# Patient Record
Sex: FEMALE | Race: WHITE | NOT HISPANIC OR LATINO | Employment: UNEMPLOYED | URBAN - METROPOLITAN AREA
[De-identification: names, ages, dates, MRNs, and addresses within clinical notes are randomized per-mention and may not be internally consistent; named-entity substitution may affect disease eponyms.]

---

## 2019-09-24 ENCOUNTER — CONSULT (OUTPATIENT)
Dept: GASTROENTEROLOGY | Facility: CLINIC | Age: 54
End: 2019-09-24
Payer: COMMERCIAL

## 2019-09-24 ENCOUNTER — TELEPHONE (OUTPATIENT)
Dept: GASTROENTEROLOGY | Facility: CLINIC | Age: 54
End: 2019-09-24

## 2019-09-24 VITALS
WEIGHT: 133 LBS | SYSTOLIC BLOOD PRESSURE: 154 MMHG | DIASTOLIC BLOOD PRESSURE: 100 MMHG | HEIGHT: 65 IN | BODY MASS INDEX: 22.16 KG/M2 | HEART RATE: 74 BPM

## 2019-09-24 DIAGNOSIS — D50.0 IRON DEFICIENCY ANEMIA DUE TO CHRONIC BLOOD LOSS: ICD-10-CM

## 2019-09-24 DIAGNOSIS — I10 HYPERTENSION, UNSPECIFIED TYPE: ICD-10-CM

## 2019-09-24 DIAGNOSIS — R13.10 DYSPHAGIA, UNSPECIFIED TYPE: ICD-10-CM

## 2019-09-24 DIAGNOSIS — Z12.11 SCREENING FOR COLON CANCER: Primary | ICD-10-CM

## 2019-09-24 DIAGNOSIS — R10.13 EPIGASTRIC PAIN: Primary | ICD-10-CM

## 2019-09-24 DIAGNOSIS — Z83.71 FAMILY HISTORY OF COLONIC POLYPS: ICD-10-CM

## 2019-09-24 PROBLEM — Z83.719 FAMILY HISTORY OF COLONIC POLYPS: Status: ACTIVE | Noted: 2019-09-24

## 2019-09-24 PROCEDURE — 99204 OFFICE O/P NEW MOD 45 MIN: CPT | Performed by: INTERNAL MEDICINE

## 2019-09-24 RX ORDER — IVERMECTIN 10 MG/G
CREAM TOPICAL
COMMUNITY
Start: 2018-11-21

## 2019-09-24 RX ORDER — CETIRIZINE HYDROCHLORIDE 10 MG/1
1 TABLET ORAL AS NEEDED
COMMUNITY

## 2019-09-24 RX ORDER — FERROUS SULFATE 325(65) MG
TABLET ORAL
COMMUNITY
Start: 2018-11-21 | End: 2020-08-12

## 2019-09-24 RX ORDER — TRANEXAMIC ACID 650 1/1
2 TABLET ORAL 3 TIMES DAILY
COMMUNITY
Start: 2019-07-10

## 2019-09-24 RX ORDER — DOCUSATE SODIUM 100 MG/1
CAPSULE, LIQUID FILLED ORAL
COMMUNITY
End: 2020-08-12

## 2019-09-24 RX ORDER — MULTIVITAMIN
1 TABLET ORAL DAILY
COMMUNITY

## 2019-09-24 NOTE — PROGRESS NOTES
[facility] - Outpatient Consultation  Margarita Villagomez 47 y o  female MRN: [id number]  Encounter: [id number]    ASSESSMENT AND PLAN:      1  Epigastric pain  49-year-old female with 2 weeks of severe epigastric pain and dysphagia in setting of chronic aspirin use for headaches  Worrisome for peptic ulcer disease with peptic stricture  - Schedule EGD @ [facility]   - will start PPI therapy postprocedure as patient would like to see the results from EGD 1st  - stop all NSAIDs and Excedrin  - limit alcohol intake during this time    2  Dysphagia, unspecified type  EGD +/- dilation if evidence of stricture noted    3  Hypertension, unspecified type  Blood pressure is 150/100 today  I asked the patient to follow up with her primary care doctor regarding this  Patient thinks that the blood pressure comes when she has bad headaches which appears to be due to hormonal changes  4  Iron deficiency anemia due to chronic blood loss  History of severe metromenorrhagia status post D and C this past summer with improvement  Last iron studies noted from July 2019 showed normal hemoglobin and normal iron counts  I recommended that she stops iron supplementation at this point but she is nervous because when she is iron deficient, she gets a lot of restless leg and angular cheilitis  She will discuss with her GYN  5  Family history of colonic polyps  Brother with colon polyps  Patient has never had any colonoscopies so we will schedule today  Followup Appointment:  3 months  ______________________________________________________________________    Chief Complaint   Patient presents with    Dysphagia     feels like a lump in throat    Upset stomach       HPI:   Margarita Villagomez is a 47y o  year old female who presents today as a new patient at the request of her primary care provider for epigastric pain and dysphagia    Patient states that for 2 years now she has had severe menstrual bleeding  She has been seeing her GYN who put her on iron supplementation for iron deficiency anemia  She had a recent D and C to help alleviate this situation  However, with the hormone changes, she has been having a lot of headaches  And she has been taking a lot of Excedrin and aspirin  In the past 2 weeks, she has noticed a lot of epigastric discomfort, sharp twisting pains, nonradiating, occasional nausea associated with symptoms  She is also having some issues with trouble swallowing characterized by chest tightness with eating and sensation that she is choking  Denies any GI bleeding or diarrhea or bowel changes  Denies melena although her stools are dark due to her iron supplementation  The core of the issues is that she gets these headaches from her hormone changes and high blood pressure for which she will follow-up with her primary care doctor about      Historical Information   Past Medical History:   Diagnosis Date    Anemia     due to heave menses    Headache     Rosacea     Seasonal allergies      Past Surgical History:   Procedure Laterality Date    DILATION AND CURETTAGE OF UTERUS  09/2019    LAPAROSCOPY       Social History     Substance and Sexual Activity   Alcohol Use Yes    Frequency: 2-4 times a month     Social History     Substance and Sexual Activity   Drug Use Never     Social History     Tobacco Use   Smoking Status Never Smoker   Smokeless Tobacco Never Used     Family History   Problem Relation Age of Onset    Hypertension Mother     Hypertension Father     Hypertension Sister     Colon polyps Brother     Hypertension Sister     Colon cancer Neg Hx     Inflammatory bowel disease Neg Hx        Meds/Allergies     Current Outpatient Medications:     cetirizine (ZYRTEC ALLERGY) 10 mg tablet    docusate sodium (COLACE) 100 mg capsule    ferrous sulfate 325 (65 Fe) mg tablet    Ivermectin (SOOLANTRA) 1 % CREA    Magnesium 100 MG CAPS   Multiple Vitamin (MULTIVITAMIN) tablet    Tranexamic Acid 650 MG TABS    Allergies   Allergen Reactions    Codeine        PHYSICAL EXAM:    Blood pressure 154/100, pulse 74, height 5' 4 5" (1 638 m), weight 60 3 kg (133 lb)  Body mass index is 22 48 kg/m²  General Appearance: NAD, cooperative, alert  Eyes: Anicteric, PERRLA, EOMI  ENT:  Normocephalic, atraumatic, normal mucosa  Neck:  Supple, symmetrical, trachea midline,   Resp:  Clear to auscultation bilaterally; no rales, rhonchi or wheezing; respirations unlabored   CV:  S1 S2, Regular rate and rhythm; no murmur, rub, or gallop  GI:  Soft, non-tender, non-distended; normal bowel sounds; no masses, no organomegaly   Rectal: Deferred  Musculoskeletal: No cyanosis, clubbing or edema  Normal ROM  Skin:  No jaundice, rashes, or lesions   Heme/Lymph: No palpable cervical lymphadenopathy  Psych: Normal affect, good eye contact  Neuro: No gross deficits, AAOx3    Lab Results:   No results found for: WBC, HGB, HCT, MCV, PLT  No results found for: NA, K, CL, CO2, ANIONGAP, BUN, CREATININE, GLUCOSE, GLUF, CALCIUM, CORRECTEDCA, AST, ALT, ALKPHOS, PROT, BILITOT, EGFR  No results found for: IRON, TIBC, FERRITIN  No results found for: LIPASE    Radiology Results:   No results found  REVIEW OF SYSTEMS:    CONSTITUTIONAL: Denies any fever, chills, rigors, and weight loss  Positive fatigue  HEENT: No earache or tinnitus  Denies hearing loss or visual disturbances  CARDIOVASCULAR: No chest pain or palpitations  RESPIRATORY: Denies any cough, hemoptysis, shortness of breath or dyspnea on exertion  GASTROINTESTINAL: As noted in the History of Present Illness  GENITOURINARY: No problems with urination  Denies any hematuria or dysuria  NEUROLOGIC: No dizziness or vertigo, denies headaches  MUSCULOSKELETAL: Denies any muscle or joint pain  SKIN: Denies skin rashes or itching  ENDOCRINE: Denies excessive thirst  Denies intolerance to heat or cold    PSYCHOSOCIAL: Denies depression or anxiety  Denies any recent memory loss

## 2019-09-24 NOTE — LETTER
September 24, 2019     ANNITA Solano  [address] [address]    Patient: Joel Rodriguez   YOB: 1965   Date of Visit: 9/24/2019       Dear Dr Indira Rosas:    Thank you for referring Joel Rodriguez to me for evaluation  Below are my notes for this consultation  If you have questions, please do not hesitate to call me  I look forward to following your patient along with you  Sincerely,        Gabbie Harley MD        CC: No Recipients  Gabbie Harley MD  9/24/2019  9:45 AM  Sign at close encounter    [address] [facility] - Outpatient Consultation  Joel Rodriguez 47 y o  female MRN: [id number]  Encounter: [id number]    ASSESSMENT AND PLAN:      1  Epigastric pain  59-year-old female with 2 weeks of severe epigastric pain and dysphagia in setting of chronic aspirin use for headaches  Worrisome for peptic ulcer disease with peptic stricture  - Schedule EGD @ [address] [place] [address]   - will start PPI therapy postprocedure as patient would like to see the results from EGD 1st  - stop all NSAIDs and Excedrin  - limit alcohol intake during this time    2  Dysphagia, unspecified type  EGD +/- dilation if evidence of stricture noted    3  Hypertension, unspecified type  Blood pressure is 150/100 today  I asked the patient to follow up with her primary care doctor regarding this  Patient thinks that the blood pressure comes when she has bad headaches which appears to be due to hormonal changes  4  Iron deficiency anemia due to chronic blood loss  History of severe metromenorrhagia status post D and C this past summer with improvement  Last iron studies noted from July 2019 showed normal hemoglobin and normal iron counts  I recommended that she stops iron supplementation at this point but she is nervous because when she is iron deficient, she gets a lot of restless leg and angular cheilitis  She will discuss with her GYN      5  Family history of colonic polyps  Brother with colon polyps  Patient has never had any colonoscopies so we will schedule today  Followup Appointment:  3 months  ______________________________________________________________________    Chief Complaint   Patient presents with    Dysphagia     feels like a lump in throat    Upset stomach       HPI:   Alethea Kirby is a 47y o  year old female who presents today as a new patient at the request of her primary care provider for epigastric pain and dysphagia  Patient states that for 2 years now she has had severe menstrual bleeding  She has been seeing her GYN who put her on iron supplementation for iron deficiency anemia  She had a recent D and C to help alleviate this situation  However, with the hormone changes, she has been having a lot of headaches  And she has been taking a lot of Excedrin and aspirin  In the past 2 weeks, she has noticed a lot of epigastric discomfort, sharp twisting pains, nonradiating, occasional nausea associated with symptoms  She is also having some issues with trouble swallowing characterized by chest tightness with eating and sensation that she is choking  Denies any GI bleeding or diarrhea or bowel changes  Denies melena although her stools are dark due to her iron supplementation  The core of the issues is that she gets these headaches from her hormone changes and high blood pressure for which she will follow-up with her primary care doctor about      Historical Information   Past Medical History:   Diagnosis Date    Anemia     due to heave menses    Headache     Rosacea     Seasonal allergies      Past Surgical History:   Procedure Laterality Date    DILATION AND CURETTAGE OF UTERUS  09/2019    LAPAROSCOPY       Social History     Substance and Sexual Activity   Alcohol Use Yes    Frequency: 2-4 times a month     Social History     Substance and Sexual Activity   Drug Use Never     Social History     Tobacco Use Smoking Status Never Smoker   Smokeless Tobacco Never Used     Family History   Problem Relation Age of Onset    Hypertension Mother     Hypertension Father     Hypertension Sister     Colon polyps Brother     Hypertension Sister     Colon cancer Neg Hx     Inflammatory bowel disease Neg Hx        Meds/Allergies     Current Outpatient Medications:     cetirizine (ZYRTEC ALLERGY) 10 mg tablet    docusate sodium (COLACE) 100 mg capsule    ferrous sulfate 325 (65 Fe) mg tablet    Ivermectin (SOOLANTRA) 1 % CREA    Magnesium 100 MG CAPS    Multiple Vitamin (MULTIVITAMIN) tablet    Tranexamic Acid 650 MG TABS    Allergies   Allergen Reactions    Codeine        PHYSICAL EXAM:    Blood pressure 154/100, pulse 74, height 5' 4 5" (1 638 m), weight 60 3 kg (133 lb)  Body mass index is 22 48 kg/m²  General Appearance: NAD, cooperative, alert  Eyes: Anicteric, PERRLA, EOMI  ENT:  Normocephalic, atraumatic, normal mucosa  Neck:  Supple, symmetrical, trachea midline,   Resp:  Clear to auscultation bilaterally; no rales, rhonchi or wheezing; respirations unlabored   CV:  S1 S2, Regular rate and rhythm; no murmur, rub, or gallop  GI:  Soft, non-tender, non-distended; normal bowel sounds; no masses, no organomegaly   Rectal: Deferred  Musculoskeletal: No cyanosis, clubbing or edema  Normal ROM  Skin:  No jaundice, rashes, or lesions   Heme/Lymph: No palpable cervical lymphadenopathy  Psych: Normal affect, good eye contact  Neuro: No gross deficits, AAOx3    Lab Results:   No results found for: WBC, HGB, HCT, MCV, PLT  No results found for: NA, K, CL, CO2, ANIONGAP, BUN, CREATININE, GLUCOSE, GLUF, CALCIUM, CORRECTEDCA, AST, ALT, ALKPHOS, PROT, BILITOT, EGFR  No results found for: IRON, TIBC, FERRITIN  No results found for: LIPASE    Radiology Results:   No results found  REVIEW OF SYSTEMS:    CONSTITUTIONAL: Denies any fever, chills, rigors, and weight loss  Positive fatigue  HEENT: No earache or tinnitus  Denies hearing loss or visual disturbances  CARDIOVASCULAR: No chest pain or palpitations  RESPIRATORY: Denies any cough, hemoptysis, shortness of breath or dyspnea on exertion  GASTROINTESTINAL: As noted in the History of Present Illness  GENITOURINARY: No problems with urination  Denies any hematuria or dysuria  NEUROLOGIC: No dizziness or vertigo, denies headaches  MUSCULOSKELETAL: Denies any muscle or joint pain  SKIN: Denies skin rashes or itching  ENDOCRINE: Denies excessive thirst  Denies intolerance to heat or cold  PSYCHOSOCIAL: Denies depression or anxiety  Denies any recent memory loss

## 2019-09-24 NOTE — TELEPHONE ENCOUNTER
Combo per Dr Moreno Shall  Patient will cb to schedule unsure with her schedule  Please follow up   She did have her prep today

## 2019-09-25 NOTE — PROGRESS NOTES
I called [employer], requested they enter a fax number for contact: Char Phoenix fax #[phone]  I mannually faxed the note as requested

## 2019-10-04 NOTE — TELEPHONE ENCOUNTER
Pt left  mssg stating she has colon and upper endo 10/30 at 1240; has ques about medicines; takes blood pressure meds and has headaches/takes Tylenol; asks for [phone]

## 2019-10-31 DIAGNOSIS — R10.13 EPIGASTRIC PAIN: Primary | ICD-10-CM

## 2019-10-31 NOTE — TELEPHONE ENCOUNTER
Pt seen at [facility]  Dr Romero Cassette prescribed omeprazole 40 mg  One daily  Qty 30 with 2 refills

## 2019-11-01 RX ORDER — OMEPRAZOLE 40 MG/1
40 CAPSULE, DELAYED RELEASE ORAL DAILY
Qty: 30 CAPSULE | Refills: 2 | Status: SHIPPED | OUTPATIENT
Start: 2019-11-01 | End: 2020-01-24 | Stop reason: SDUPTHER

## 2019-11-06 ENCOUNTER — TELEPHONE (OUTPATIENT)
Dept: GASTROENTEROLOGY | Facility: CLINIC | Age: 54
End: 2019-11-06

## 2019-11-06 NOTE — TELEPHONE ENCOUNTER
Called patient regarding results from EGD and colonoscopy performed on October 30, 2019 for epigastric pain, dysphagia, family history of colon polyps  Biopsies negative for celiac, H pylori, dysplasia, eosinophilic esophagitis, Wilcox's  Colon polyp is hyperplastic  Area of inflammation is likely procedure related negative for colitis or dysplasia  Instructed patient to follow up with us in the office      RECALL:  Colon recall 5 years

## 2020-01-24 DIAGNOSIS — R10.13 EPIGASTRIC PAIN: ICD-10-CM

## 2020-01-24 RX ORDER — OMEPRAZOLE 40 MG/1
40 CAPSULE, DELAYED RELEASE ORAL DAILY
Qty: 30 CAPSULE | Refills: 6 | Status: SHIPPED | OUTPATIENT
Start: 2020-01-24 | End: 2020-02-20

## 2020-01-24 NOTE — TELEPHONE ENCOUNTER
Pt called to request refill of Omeprazole 40; notes she was supposed to sched f/u appt after proc but just made appt  Pharmacy is [facility] in [place]  If ques # cell [phone]  Req'd Pt sign initiate MyChart signup

## 2020-02-20 ENCOUNTER — OFFICE VISIT (OUTPATIENT)
Dept: GASTROENTEROLOGY | Facility: CLINIC | Age: 55
End: 2020-02-20
Payer: COMMERCIAL

## 2020-02-20 VITALS
WEIGHT: 143 LBS | DIASTOLIC BLOOD PRESSURE: 80 MMHG | SYSTOLIC BLOOD PRESSURE: 122 MMHG | BODY MASS INDEX: 24.41 KG/M2 | HEART RATE: 86 BPM | HEIGHT: 64 IN

## 2020-02-20 DIAGNOSIS — K29.60 EROSIVE GASTRITIS: Primary | ICD-10-CM

## 2020-02-20 DIAGNOSIS — R10.13 EPIGASTRIC PAIN: ICD-10-CM

## 2020-02-20 DIAGNOSIS — K21.00 GASTROESOPHAGEAL REFLUX DISEASE WITH ESOPHAGITIS: ICD-10-CM

## 2020-02-20 DIAGNOSIS — R13.10 DYSPHAGIA, UNSPECIFIED TYPE: ICD-10-CM

## 2020-02-20 DIAGNOSIS — Z83.71 FAMILY HISTORY OF COLONIC POLYPS: ICD-10-CM

## 2020-02-20 PROCEDURE — 99214 OFFICE O/P EST MOD 30 MIN: CPT | Performed by: INTERNAL MEDICINE

## 2020-02-20 RX ORDER — AMLODIPINE BESYLATE AND ATORVASTATIN CALCIUM 5; 10 MG/1; MG/1
1 TABLET, FILM COATED ORAL DAILY
COMMUNITY
End: 2020-08-12

## 2020-02-20 RX ORDER — OMEPRAZOLE 20 MG/1
20 CAPSULE, DELAYED RELEASE ORAL DAILY
Qty: 90 CAPSULE | Refills: 3 | Status: SHIPPED | OUTPATIENT
Start: 2020-02-20 | End: 2020-08-12 | Stop reason: SDUPTHER

## 2020-02-20 RX ORDER — FAMOTIDINE 40 MG/1
40 TABLET, FILM COATED ORAL
Qty: 90 TABLET | Refills: 3 | Status: SHIPPED | OUTPATIENT
Start: 2020-02-20 | End: 2020-08-12 | Stop reason: SDUPTHER

## 2020-02-20 NOTE — PROGRESS NOTES
[address] [facility] - Outpatient Follow-up Note  Dk Nolen 47 y o  female MRN: [id number]  Encounter: [id number]    ASSESSMENT AND PLAN:      1  Erosive gastritis  51-year-old female here today for follow-up  Doing much better on 40 mg of omeprazole  Taking much less NSAIDs although admits she is still taking a little bit in the morning when she has headaches  She thinks this may be associated with her being perimenopausal     - I discussed with her our goals of doing the lowest effective dose possible  She would like to finish her omeprazole 40 mg daily for this month and then dropped to 20 mg daily  I will give her some Pepcid to use at night as needed when she has her headaches if the 20 of omeprazole is not enough  - GERD lifestyle modifications discussed, especially limiting her caffeine intake and minimizing her NSAIDs    - omeprazole (PriLOSEC) 20 mg delayed release capsule; Take 1 capsule (20 mg total) by mouth daily  Dispense: 90 capsule; Refill: 3  - famotidine (PEPCID) 40 MG tablet; Take 1 tablet (40 mg total) by mouth daily at bedtime as needed for heartburn  Dispense: 90 tablet; Refill: 3    2  Epigastric pain  Improved on PPI, EGD otherwise okay    3  Family history of colonic polyps  Recall October 2024    4  Gastroesophageal reflux disease with esophagitis    - omeprazole (PriLOSEC) 20 mg delayed release capsule; Take 1 capsule (20 mg total) by mouth daily  Dispense: 90 capsule; Refill: 3  - famotidine (PEPCID) 40 MG tablet; Take 1 tablet (40 mg total) by mouth daily at bedtime as needed for heartburn  Dispense: 90 tablet; Refill: 3    5  Dysphagia, unspecified type  Improved after dilation in October 2019      Followup Appointment:  6 months  ______________________________________________________________________    Chief Complaint   Patient presents with    Follow-up    Medication Management     HPI:  51-year-old female here today for follow-up    Doing much better  Omeprazole 40 mg daily is helping with her pain and no longer having trouble swallowing after the dilation  Admits she is still trying to limit her NSAID use but has not been able to do so completely  She gets a lot of headaches and has to take Excedrin and other meds in the morning  She tries to take them on and with food  Denies any nausea vomiting or GI bleeding  Weight is stable  She is still having her menses of the thinks that she is going into menopause      Historical Information   Past Medical History:   Diagnosis Date    Anemia     due to heave menses    Headache     Rosacea     Seasonal allergies      Past Surgical History:   Procedure Laterality Date    DILATION AND CURETTAGE OF UTERUS  09/2019    LAPAROSCOPY       Social History     Substance and Sexual Activity   Alcohol Use Yes    Frequency: 2-4 times a month     Social History     Substance and Sexual Activity   Drug Use Never     Social History     Tobacco Use   Smoking Status Never Smoker   Smokeless Tobacco Never Used     Family History   Problem Relation Age of Onset    Hypertension Mother     Hypertension Father     Hypertension Sister     Colon polyps Brother     Hypertension Sister     Colon cancer Neg Hx     Inflammatory bowel disease Neg Hx          Current Outpatient Medications:     amLODIPine-atorvastatin (CADUET) 5-10 MG per tablet    cetirizine (ZYRTEC ALLERGY) 10 mg tablet    Ivermectin (SOOLANTRA) 1 % CREA    Magnesium 100 MG CAPS    Multiple Vitamin (MULTIVITAMIN) tablet    docusate sodium (COLACE) 100 mg capsule    famotidine (PEPCID) 40 MG tablet    ferrous sulfate 325 (65 Fe) mg tablet    omeprazole (PriLOSEC) 20 mg delayed release capsule    Sod Picosulfate-Mag Ox-Cit Acd (CLENPIQ) 10-3 5-12 MG-GM -GM/160ML SOLN    Tranexamic Acid 650 MG TABS  Allergies   Allergen Reactions    Codeine      Reviewed medications and allergies and updated as indicated    PHYSICAL EXAM:    Blood pressure 122/80, pulse 86, height 5' 4" (1 626 m), weight 64 9 kg (143 lb)  Body mass index is 24 55 kg/m²  General Appearance: NAD, cooperative, alert  Eyes: Anicteric, PERRLA, EOMI  ENT:  Normocephalic, atraumatic, normal mucosa  Neck:  Supple, symmetrical, trachea midline  Resp:  Clear to auscultation bilaterally; no rales, rhonchi or wheezing; respirations unlabored   CV:  S1 S2, Regular rate and rhythm; no murmur, rub, or gallop  GI:  Soft, non-tender, non-distended; normal bowel sounds; no masses, no organomegaly   Rectal: Deferred  Musculoskeletal: No cyanosis, clubbing or edema  Normal ROM  Skin:  No jaundice, rashes, or lesions   Heme/Lymph: No palpable cervical lymphadenopathy  Psych: Normal affect, good eye contact  Neuro: No gross deficits, AAOx3    Lab Results:   No results found for: WBC, HGB, HCT, MCV, PLT  No results found for: NA, K, CL, CO2, ANIONGAP, BUN, CREATININE, GLUCOSE, GLUF, CALCIUM, CORRECTEDCA, AST, ALT, ALKPHOS, PROT, BILITOT, EGFR  No results found for: IRON, TIBC, FERRITIN  No results found for: LIPASE    Radiology Results:   No results found

## 2020-02-20 NOTE — LETTER
February 20, 2020     Tess Christensen    Patient: Toni Arceo   YOB: 1965   Date of Visit: 2/20/2020       Dear Dr Juan Jose Tesfaye:    Thank you for referring Toni Arceo to me for evaluation  Below are my notes for this consultation  If you have questions, please do not hesitate to call me  I look forward to following your patient along with you  Sincerely,        Riri Mason MD        CC: No Recipients  Riri Mason MD  2/20/2020 12:39 PM  Incomplete  [facility] - Outpatient Follow-up Note  Toni Arceo 47 y o  female MRN: [id number]  Encounter: [id number]    ASSESSMENT AND PLAN:      1  Erosive gastritis  59-year-old female here today for follow-up  Doing much better on 40 mg of omeprazole  Taking much less NSAIDs although admits she is still taking a little bit in the morning when she has headaches  She thinks this may be associated with her being perimenopausal     - I discussed with her our goals of doing the lowest effective dose possible  She would like to finish her omeprazole 40 mg daily for this month and then dropped to 20 mg daily  I will give her some Pepcid to use at night as needed when she has her headaches if the 20 of omeprazole is not enough  - GERD lifestyle modifications discussed, especially limiting her caffeine intake and minimizing her NSAIDs    - omeprazole (PriLOSEC) 20 mg delayed release capsule; Take 1 capsule (20 mg total) by mouth daily  Dispense: 90 capsule; Refill: 3  - famotidine (PEPCID) 40 MG tablet; Take 1 tablet (40 mg total) by mouth daily at bedtime as needed for heartburn  Dispense: 90 tablet; Refill: 3    2  Epigastric pain  Improved on PPI, EGD otherwise okay    3  Family history of colonic polyps  Recall October 2024    4  Gastroesophageal reflux disease with esophagitis    - omeprazole (PriLOSEC) 20 mg delayed release capsule;  Take 1 capsule (20 mg total) by mouth daily  Dispense: 90 capsule; Refill: 3  - famotidine (PEPCID) 40 MG tablet; Take 1 tablet (40 mg total) by mouth daily at bedtime as needed for heartburn  Dispense: 90 tablet; Refill: 3    5  Dysphagia, unspecified type  Improved after dilation in October 2019      Followup Appointment:  6 months  ______________________________________________________________________    Chief Complaint   Patient presents with    Follow-up    Medication Management     HPI:  49-year-old female here today for follow-up  Doing much better  Omeprazole 40 mg daily is helping with her pain and no longer having trouble swallowing after the dilation  Admits she is still trying to limit her NSAID use but has not been able to do so completely  She gets a lot of headaches and has to take Excedrin and other meds in the morning  She tries to take them on and with food  Denies any nausea vomiting or GI bleeding  Weight is stable  She is still having her menses of the thinks that she is going into menopause      Historical Information   Past Medical History:   Diagnosis Date    Anemia     due to heave menses    Headache     Rosacea     Seasonal allergies      Past Surgical History:   Procedure Laterality Date    DILATION AND CURETTAGE OF UTERUS  09/2019    LAPAROSCOPY       Social History     Substance and Sexual Activity   Alcohol Use Yes    Frequency: 2-4 times a month     Social History     Substance and Sexual Activity   Drug Use Never     Social History     Tobacco Use   Smoking Status Never Smoker   Smokeless Tobacco Never Used     Family History   Problem Relation Age of Onset    Hypertension Mother     Hypertension Father     Hypertension Sister     Colon polyps Brother     Hypertension Sister     Colon cancer Neg Hx     Inflammatory bowel disease Neg Hx          Current Outpatient Medications:     amLODIPine-atorvastatin (CADUET) 5-10 MG per tablet    cetirizine (ZYRTEC ALLERGY) 10 mg tablet    Ivermectin (SOOLANTRA) 1 % CREA    Magnesium 100 MG CAPS    Multiple Vitamin (MULTIVITAMIN) tablet    docusate sodium (COLACE) 100 mg capsule    famotidine (PEPCID) 40 MG tablet    ferrous sulfate 325 (65 Fe) mg tablet    omeprazole (PriLOSEC) 20 mg delayed release capsule    Sod Picosulfate-Mag Ox-Cit Acd (CLENPIQ) 10-3 5-12 MG-GM -GM/160ML SOLN    Tranexamic Acid 650 MG TABS  Allergies   Allergen Reactions    Codeine      Reviewed medications and allergies and updated as indicated    PHYSICAL EXAM:    Blood pressure 122/80, pulse 86, height 5' 4" (1 626 m), weight 64 9 kg (143 lb)  Body mass index is 24 55 kg/m²  General Appearance: NAD, cooperative, alert  Eyes: Anicteric, PERRLA, EOMI  ENT:  Normocephalic, atraumatic, normal mucosa  Neck:  Supple, symmetrical, trachea midline  Resp:  Clear to auscultation bilaterally; no rales, rhonchi or wheezing; respirations unlabored   CV:  S1 S2, Regular rate and rhythm; no murmur, rub, or gallop  GI:  Soft, non-tender, non-distended; normal bowel sounds; no masses, no organomegaly   Rectal: Deferred  Musculoskeletal: No cyanosis, clubbing or edema  Normal ROM  Skin:  No jaundice, rashes, or lesions   Heme/Lymph: No palpable cervical lymphadenopathy  Psych: Normal affect, good eye contact  Neuro: No gross deficits, AAOx3    Lab Results:   No results found for: WBC, HGB, HCT, MCV, PLT  No results found for: NA, K, CL, CO2, ANIONGAP, BUN, CREATININE, GLUCOSE, GLUF, CALCIUM, CORRECTEDCA, AST, ALT, ALKPHOS, PROT, BILITOT, EGFR  No results found for: IRON, TIBC, FERRITIN  No results found for: LIPASE    Radiology Results:   No results found

## 2020-08-12 ENCOUNTER — TELEMEDICINE (OUTPATIENT)
Dept: GASTROENTEROLOGY | Facility: CLINIC | Age: 55
End: 2020-08-12
Payer: COMMERCIAL

## 2020-08-12 VITALS — BODY MASS INDEX: 22.88 KG/M2 | WEIGHT: 134 LBS | HEIGHT: 64 IN

## 2020-08-12 DIAGNOSIS — R13.10 DYSPHAGIA, UNSPECIFIED TYPE: ICD-10-CM

## 2020-08-12 DIAGNOSIS — Z83.71 FAMILY HISTORY OF COLONIC POLYPS: ICD-10-CM

## 2020-08-12 DIAGNOSIS — K21.00 GASTROESOPHAGEAL REFLUX DISEASE WITH ESOPHAGITIS: ICD-10-CM

## 2020-08-12 DIAGNOSIS — K29.60 EROSIVE GASTRITIS: Primary | ICD-10-CM

## 2020-08-12 PROCEDURE — 99214 OFFICE O/P EST MOD 30 MIN: CPT | Performed by: INTERNAL MEDICINE

## 2020-08-12 RX ORDER — OMEPRAZOLE 20 MG/1
20 CAPSULE, DELAYED RELEASE ORAL DAILY
Qty: 90 CAPSULE | Refills: 3 | Status: SHIPPED | OUTPATIENT
Start: 2020-08-12

## 2020-08-12 RX ORDER — FAMOTIDINE 40 MG/1
40 TABLET, FILM COATED ORAL
Qty: 90 TABLET | Refills: 3 | Status: SHIPPED | OUTPATIENT
Start: 2020-08-12 | End: 2021-09-22

## 2020-08-12 RX ORDER — ASPIRIN 81 MG/1
81 TABLET ORAL DAILY
COMMUNITY

## 2020-08-12 NOTE — PROGRESS NOTES
Virtual Regular Visit      Assessment/Plan:    Problem List Items Addressed This Visit        Digestive    Dysphagia    Erosive gastritis - Primary    Relevant Medications    omeprazole (PriLOSEC) 20 mg delayed release capsule    famotidine (PEPCID) 40 MG tablet    Gastroesophageal reflux disease with esophagitis    Relevant Medications    omeprazole (PriLOSEC) 20 mg delayed release capsule    famotidine (PEPCID) 40 MG tablet       Other    Family history of colonic polyps               Reason for visit is   Chief Complaint   Patient presents with    Follow up-erosive gastritis    Virtual Regular Visit        Encounter provider Ivanna Muñoz MD    Provider located at [address] [address] [phone]      Recent Visits  No visits were found meeting these conditions  Showing recent visits within past 7 days and meeting all other requirements     Today's Visits  Date Type Provider Dept   08/12/20 Telemedicine Ivanna Muñoz MD [facility]   Showing today's visits and meeting all other requirements     Future Appointments  No visits were found meeting these conditions  Showing future appointments within next 150 days and meeting all other requirements        The patient was identified by name and date of birth  David Toth was informed that this is a telemedicine visit and that the visit is being conducted through [address] [place] [address] and patient was informed that this is not a secure, HIPAA-complaint platform  She agrees to proceed     My office door was closed  No one else was in the room  She acknowledged consent and understanding of privacy and security of the video platform  The patient has agreed to participate and understands they can discontinue the visit at any time  Patient is aware this is a billable service  Subjective  David Toth is a 47 y o  female presents today for follow-up    Was able to successfully come down to 20 mg of omeprazole without having refractory symptoms  Also trying to minimize her ibuprofen intake although admittedly, she still gets her headaches which she attributes to her hypertension  She is continually working on getting her blood pressure better controlled with her primary care doctor  Denies significant heartburn although couple times after bigger meals, she did have some symptoms and took Pepcid at night with immediate resolution  No GI bleeding  No weight loss  No changes in bowels  No dysphagia or early satiety  ASSESSMENT AND PLAN:      1  Erosive gastritis  59-year-old female with reflux and erosive gastritis on EGD, likely secondary to ibuprofen intake for her headaches  We were able to drop her omeprazole to 20 mg with 99% resolution of symptoms, occasionally requiring Pepcid at night  Still perimenopausal     - at this point, she would like to stay on the 20 mg of omeprazole as she has tried to come off of it in the past 6 months without success  - I discussed with her the risks and benefits of long-term PPI use with the goal of getting on the lowest effective dose possible  - GERD lifestyle modifications  - minimize NSAIDs    - omeprazole (PriLOSEC) 20 mg delayed release capsule; Take 1 capsule (20 mg total) by mouth daily  Dispense: 90 capsule; Refill: 3  - famotidine (PEPCID) 40 MG tablet; Take 1 tablet (40 mg total) by mouth daily at bedtime as needed for heartburn  Dispense: 90 tablet; Refill: 3    2  Gastroesophageal reflux disease with esophagitis  EGD October 2019 negative for Wilcox's  - omeprazole (PriLOSEC) 20 mg delayed release capsule; Take 1 capsule (20 mg total) by mouth daily  Dispense: 90 capsule; Refill: 3  - famotidine (PEPCID) 40 MG tablet; Take 1 tablet (40 mg total) by mouth daily at bedtime as needed for heartburn  Dispense: 90 tablet; Refill: 3    3   Dysphagia, unspecified type  Improved after dilation in October 2019    4  Family history of colonic polyps  Recall October 2024      Followup Appointment:  1 year  ______________________________________________________________________      Historical Information   Past Medical History:   Diagnosis Date    Anemia     due to heave menses    Headache     Rosacea     Seasonal allergies      Past Surgical History:   Procedure Laterality Date    DILATION AND CURETTAGE OF UTERUS  09/2019    LAPAROSCOPY       Social History     Substance and Sexual Activity   Alcohol Use Yes    Frequency: 2-4 times a month     Social History     Substance and Sexual Activity   Drug Use Never     Social History     Tobacco Use   Smoking Status Never Smoker   Smokeless Tobacco Never Used     Family History   Problem Relation Age of Onset    Hypertension Mother     Hypertension Father     Hypertension Sister     Colon polyps Brother     Hypertension Sister     Colon cancer Neg Hx     Inflammatory bowel disease Neg Hx        Meds/Allergies       Current Outpatient Medications:     aspirin (ECOTRIN LOW STRENGTH) 81 mg EC tablet    cetirizine (ZYRTEC ALLERGY) 10 mg tablet    famotidine (PEPCID) 40 MG tablet    Ivermectin (SOOLANTRA) 1 % CREA    Magnesium 100 MG CAPS    omeprazole (PriLOSEC) 20 mg delayed release capsule    valsartan 160 mg TABS 320 mg, hydrochlorothiazide 25 mg TABS 25 mg    Multiple Vitamin (MULTIVITAMIN) tablet    Tranexamic Acid 650 MG TABS    Allergies   Allergen Reactions    Codeine        PHYSICAL EXAM:    Height 5' 4" (1 626 m), weight 60 8 kg (134 lb)  Body mass index is 23 kg/m²  Appearance and vitals taken from home devices    General Appearance:   Alert, cooperative, no distress   HEENT:  Normocephalic, atraumatic, anicteric  Neck supple, symmetrical, trachea midline  Lungs:   Equal chest rise and unlabored breathing, normal effort, no coughing  Cardiovascular:   No visualized JVD or lower extremity edema  Abdomen:   No abdominal distension  Skin:   No jaundice, rashes, or lesions  Musculoskeletal:   Normal range of motion visualized  10 feet gait without difficulty and without assistive device  Psych:  Normal affect and normal insight  Neuro:  Alert and appropriate  Ox3         Lab Results:   No results found for: WBC, HGB, HCT, MCV, PLT  No results found for: NA, K, CL, CO2, ANIONGAP, BUN, CREATININE, GLUCOSE, GLUF, CALCIUM, CORRECTEDCA, AST, ALT, ALKPHOS, PROT, BILITOT, EGFR  No results found for: IRON, TIBC, FERRITIN  No results found for: LIPASE    Radiology Results:   No results found  REVIEW OF SYSTEMS:    CONSTITUTIONAL: Denies any fever, chills, rigors, and weight loss  HEENT: No earache or tinnitus  Denies hearing loss or visual disturbances  CARDIOVASCULAR: No chest pain or palpitations  RESPIRATORY: Denies any cough, hemoptysis, shortness of breath or dyspnea on exertion  GASTROINTESTINAL: As noted in the History of Present Illness  GENITOURINARY: No problems with urination  Denies any hematuria or dysuria  NEUROLOGIC: No dizziness or vertigo, denies headaches  MUSCULOSKELETAL: Denies any muscle or joint pain  SKIN: Denies skin rashes or itching  ENDOCRINE: Denies excessive thirst  Denies intolerance to heat or cold  PSYCHOSOCIAL: Denies depression or anxiety  Denies any recent memory loss  I spent 20 minutes directly with the patient during this visit      VIRTUAL VISIT DISCLAIMER    Rhina Parada acknowledges that she has consented to an online visit or consultation  She understands that the online visit is based solely on information provided by her, and that, in the absence of a face-to-face physical evaluation by the physician, the diagnosis she receives is both limited and provisional in terms of accuracy and completeness  This is not intended to replace a full medical face-to-face evaluation by the physician  Rhina Parada understands and accepts these terms

## 2020-08-12 NOTE — LETTER
August 12, 2020     Haylie Ortiz, [address] [facility] [address] [facility]    Patient: Vanessa Walker   YOB: 1965   Date of Visit: 8/12/2020       Dear Dr Kapil Vicente:    Thank you for referring Vanessa Walker to me for evaluation  Below are my notes for this consultation  If you have questions, please do not hesitate to call me  I look forward to following your patient along with you  Sincerely,        Berle Lanes, MD        CC: No Recipients  Berle Lanes, MD  8/12/2020 11:48 AM  Sign when Signing Visit    Virtual Regular Visit      Assessment/Plan:    Problem List Items Addressed This Visit        Digestive    Dysphagia    Erosive gastritis - Primary    Relevant Medications    omeprazole (PriLOSEC) 20 mg delayed release capsule    famotidine (PEPCID) 40 MG tablet    Gastroesophageal reflux disease with esophagitis    Relevant Medications    omeprazole (PriLOSEC) 20 mg delayed release capsule    famotidine (PEPCID) 40 MG tablet       Other    Family history of colonic polyps               Reason for visit is   Chief Complaint   Patient presents with    Follow up-erosive gastritis    Virtual Regular Visit        Encounter provider Berle Lanes, MD    Provider located at [address] [address] [phone]      Recent Visits  No visits were found meeting these conditions  Showing recent visits within past 7 days and meeting all other requirements     Today's Visits  Date Type Provider Dept   08/12/20 Telemedicine Berle Lanes, MD [facility]   Showing today's visits and meeting all other requirements     Future Appointments  No visits were found meeting these conditions  Showing future appointments within next 150 days and meeting all other requirements        The patient was identified by name and date of birth   Vanessa Walker was informed that this is a telemedicine visit and that the visit is being conducted through [address] and patient was informed that this is not a secure, HIPAA-complaint platform  She agrees to proceed     My office door was closed  No one else was in the room  She acknowledged consent and understanding of privacy and security of the video platform  The patient has agreed to participate and understands they can discontinue the visit at any time  Patient is aware this is a billable service  Subjective  Reinaldo Wolfe is a 47 y o  female presents today for follow-up  Was able to successfully come down to 20 mg of omeprazole without having refractory symptoms  Also trying to minimize her ibuprofen intake although admittedly, she still gets her headaches which she attributes to her hypertension  She is continually working on getting her blood pressure better controlled with her primary care doctor  Denies significant heartburn although couple times after bigger meals, she did have some symptoms and took Pepcid at night with immediate resolution  No GI bleeding  No weight loss  No changes in bowels  No dysphagia or early satiety  ASSESSMENT AND PLAN:      1  Erosive gastritis  51-year-old female with reflux and erosive gastritis on EGD, likely secondary to ibuprofen intake for her headaches  We were able to drop her omeprazole to 20 mg with 99% resolution of symptoms, occasionally requiring Pepcid at night  Still perimenopausal     - at this point, she would like to stay on the 20 mg of omeprazole as she has tried to come off of it in the past 6 months without success  - I discussed with her the risks and benefits of long-term PPI use with the goal of getting on the lowest effective dose possible  - GERD lifestyle modifications  - minimize NSAIDs    - omeprazole (PriLOSEC) 20 mg delayed release capsule; Take 1 capsule (20 mg total) by mouth daily  Dispense: 90 capsule; Refill: 3  - famotidine (PEPCID) 40 MG tablet;  Take 1 tablet (40 mg total) by mouth daily at bedtime as needed for heartburn  Dispense: 90 tablet; Refill: 3    2  Gastroesophageal reflux disease with esophagitis  EGD October 2019 negative for Wilcox's  - omeprazole (PriLOSEC) 20 mg delayed release capsule; Take 1 capsule (20 mg total) by mouth daily  Dispense: 90 capsule; Refill: 3  - famotidine (PEPCID) 40 MG tablet; Take 1 tablet (40 mg total) by mouth daily at bedtime as needed for heartburn  Dispense: 90 tablet; Refill: 3    3  Dysphagia, unspecified type  Improved after dilation in October 2019    4   Family history of colonic polyps  Recall October 2024      Followup Appointment:  1 year  ______________________________________________________________________      Historical Information   Past Medical History:   Diagnosis Date    Anemia     due to heave menses    Headache     Rosacea     Seasonal allergies      Past Surgical History:   Procedure Laterality Date    DILATION AND CURETTAGE OF UTERUS  09/2019    LAPAROSCOPY       Social History     Substance and Sexual Activity   Alcohol Use Yes    Frequency: 2-4 times a month     Social History     Substance and Sexual Activity   Drug Use Never     Social History     Tobacco Use   Smoking Status Never Smoker   Smokeless Tobacco Never Used     Family History   Problem Relation Age of Onset    Hypertension Mother     Hypertension Father     Hypertension Sister     Colon polyps Brother     Hypertension Sister     Colon cancer Neg Hx     Inflammatory bowel disease Neg Hx        Meds/Allergies       Current Outpatient Medications:     aspirin (ECOTRIN LOW STRENGTH) 81 mg EC tablet    cetirizine (ZYRTEC ALLERGY) 10 mg tablet    famotidine (PEPCID) 40 MG tablet    Ivermectin (SOOLANTRA) 1 % CREA    Magnesium 100 MG CAPS    omeprazole (PriLOSEC) 20 mg delayed release capsule    valsartan 160 mg TABS 320 mg, hydrochlorothiazide 25 mg TABS 25 mg    Multiple Vitamin (MULTIVITAMIN) tablet    Tranexamic Acid 650 MG TABS    Allergies   Allergen Reactions    Codeine        PHYSICAL EXAM:    Height 5' 4" (1 626 m), weight 60 8 kg (134 lb)  Body mass index is 23 kg/m²  Appearance and vitals taken from home devices    General Appearance:   Alert, cooperative, no distress   HEENT:  Normocephalic, atraumatic, anicteric  Neck supple, symmetrical, trachea midline  Lungs:   Equal chest rise and unlabored breathing, normal effort, no coughing  Cardiovascular:   No visualized JVD or lower extremity edema  Abdomen:   No abdominal distension  Skin:   No jaundice, rashes, or lesions  Musculoskeletal:   Normal range of motion visualized  10 feet gait without difficulty and without assistive device  Psych:  Normal affect and normal insight  Neuro:  Alert and appropriate  Ox3         Lab Results:   No results found for: WBC, HGB, HCT, MCV, PLT  No results found for: NA, K, CL, CO2, ANIONGAP, BUN, CREATININE, GLUCOSE, GLUF, CALCIUM, CORRECTEDCA, AST, ALT, ALKPHOS, PROT, BILITOT, EGFR  No results found for: IRON, TIBC, FERRITIN  No results found for: LIPASE    Radiology Results:   No results found  REVIEW OF SYSTEMS:    CONSTITUTIONAL: Denies any fever, chills, rigors, and weight loss  HEENT: No earache or tinnitus  Denies hearing loss or visual disturbances  CARDIOVASCULAR: No chest pain or palpitations  RESPIRATORY: Denies any cough, hemoptysis, shortness of breath or dyspnea on exertion  GASTROINTESTINAL: As noted in the History of Present Illness  GENITOURINARY: No problems with urination  Denies any hematuria or dysuria  NEUROLOGIC: No dizziness or vertigo, denies headaches  MUSCULOSKELETAL: Denies any muscle or joint pain  SKIN: Denies skin rashes or itching  ENDOCRINE: Denies excessive thirst  Denies intolerance to heat or cold  PSYCHOSOCIAL: Denies depression or anxiety  Denies any recent memory loss       I spent 20 minutes directly with the patient during this visit      VIRTUAL VISIT DISCLAIMER    Rayrayne Seen acknowledges that she has consented to an online visit or consultation  She understands that the online visit is based solely on information provided by her, and that, in the absence of a face-to-face physical evaluation by the physician, the diagnosis she receives is both limited and provisional in terms of accuracy and completeness  This is not intended to replace a full medical face-to-face evaluation by the physician  Estrellita Kahn understands and accepts these terms